# Patient Record
Sex: FEMALE | Race: WHITE | NOT HISPANIC OR LATINO | Employment: UNEMPLOYED | URBAN - METROPOLITAN AREA
[De-identification: names, ages, dates, MRNs, and addresses within clinical notes are randomized per-mention and may not be internally consistent; named-entity substitution may affect disease eponyms.]

---

## 2023-05-11 LAB
CHOLESTEROL (MG/DL) IN SER/PLAS: 273 MG/DL (ref 0–199)
CHOLESTEROL IN HDL (MG/DL) IN SER/PLAS: 49.4 MG/DL
CHOLESTEROL/HDL RATIO: 5.5
LDL: 190 MG/DL (ref 0–99)
MAGNESIUM (MG/DL) IN SER/PLAS: 2.3 MG/DL (ref 1.6–2.4)
THYROTROPIN (MIU/L) IN SER/PLAS BY DETECTION LIMIT <= 0.05 MIU/L: 2.85 MIU/L (ref 0.44–3.98)
TRIGLYCERIDE (MG/DL) IN SER/PLAS: 166 MG/DL (ref 0–149)
VLDL: 33 MG/DL (ref 0–40)

## 2024-03-19 ENCOUNTER — APPOINTMENT (OUTPATIENT)
Dept: PRIMARY CARE | Facility: CLINIC | Age: 64
End: 2024-03-19
Payer: COMMERCIAL

## 2024-04-02 NOTE — PROGRESS NOTES
History Of Present Illness  HPI   The patient is a 63-year-old female who complains of a 6-month history of a left buttock mass.  She has had some increasing discomfort in the area with activity or when sitting.  It is located at the left ischial tuberosity.  She is uncertain if it has changed in size since she first noticed it.  She has not had any drainage of the area and the skin has not been red in appearance.  She does not recall any injury.  No prior similar lumps.    Past medical history:  Basal cell carcinoma of the skin  Left breast mastalgia for which I previously saw the patient on October 28, 2020  Sigmoid colectomy for diverticulitis  Laparoscopic hysterectomy on October 14, 2022.  Pathology benign.  Migraine headaches  GERD  Kidney stones status post lithotripsy and stone extraction     Family history: Melanoma in a cousin    Past Medical History  She has a past medical history of Mastodynia (10/28/2020), Personal history of other diseases of the digestive system, Personal history of other diseases of the digestive system, Personal history of other diseases of the musculoskeletal system and connective tissue, and Personal history of urinary calculi (05/12/2020).    Surgical History  She has a past surgical history that includes Other surgical history (05/12/2020); Other surgical history (05/12/2020); Other surgical history (05/12/2020); and Other surgical history (05/12/2020).     Allergies  Sulfa (sulfonamide antibiotics), Influenza virus vaccines, Metronidazole, Oxycodone-acetaminophen, Sulfamethoxazole-trimethoprim, and Tetracyclines    Social History  She reports that she has never smoked. She has never used smokeless tobacco. She reports that she does not currently use alcohol. She reports that she does not use drugs.    Family History  No family history on file.    Review of Systems  Review of Systems:  Constitutional:  no fever, no chills, no significant weight change  Neurological: No history  "of CVA or seizure disorder  Eyes: No pain, no recent visual change  ENT:  No recent hearing loss  Neck: No pain  Cardiovascular: No chest pain, no history of cardiac disease such as myocardial infarction or arrhythmia or congestive heart failure  Pulmonary: No shortness of breath, no history of pulmonary disease such as pneumonia or COPD  Breast: No history of breast disease or breast mass  Gastrointestinal:   no abdominal pain, no nausea or vomiting, no constipation or diarrhea or blood in the stool.  No history of ulcers.  No liver, gallbladder or pancreas disease.  No intestinal disorder.  Genitourinary: No hematuria or dysuria, no kidney disease  Musculoskeletal:  no arthralgia, no muscle or bone pain  Integumentary:  no rash  Psychiatric:  No anxiety or depression  Endocrine:  no history of diabetes  Hematologic/Lymphatic: No easy bruising or bleeding          Last Recorded Vitals  Blood pressure 109/71, pulse 80, temperature 36.6 °C (97.8 °F), temperature source Temporal, resp. rate 16, height 1.702 m (5' 7\"), weight 73.8 kg (162 lb 9.6 oz), SpO2 98 %.    Physical Exam  Constitutional: Well-developed, well-nourished, alert and oriented, no acute distress  Skin: Warm and dry, no lesions, no rashes, no jaundice  HEENT: Normocephalic, atraumatic, EOMI, no scleral icterus, eyes have no redness or swelling or discharge, external inspection of ears and nose is normal, mucous membranes moist  Neck: Soft, nontender, no mass or adenopathy  Cardiac: Regular rate and rhythm, no murmur  Chest: Patent airway, clear to auscultation, normal breath sounds with good chest expansion, no wheezes or rales or rhonchi noted, thorax symmetric  Abdomen: Nondistended, positive bowel sounds, soft, nontender, no mass  Rectal: Left buttock subcutaneous soft nodule located about 5 cm from the anal verge at the ischial tuberosity.  The nodule feels deep within the subcutaneous tissue and is approximately 1 cm in diameter.  Nontender.  No " erythema or overlying skin changes.  No signs of perianal abscess.  Extremities: No injury, no lower extremity edema or calf tenderness  Lymphatic: No cervical adenopathy  Musculoskeletal: Range of motion intact, no joint swelling, normal strength  Neurological: Alert and oriented x3, intact sensory and motor function, no obvious focal neurologic abnormalities, normal gait  Psychological: Appropriate mood and behavior  Examination chaperoned by Marichuy Manuel       Assessment/Plan   Diagnoses and all orders for this visit:  Mass of buttock  -     US nonvascular extremity US extremity nonvascular real time w image documentation complete; Future  Left buttock deep subcutaneous nodule at ischial tuberosity.  Uncertain etiology.  Order ultrasound for further evaluation.  Follow-up after ultrasound completed.      Renzo Bower MD

## 2024-04-03 ENCOUNTER — OFFICE VISIT (OUTPATIENT)
Dept: SURGERY | Facility: CLINIC | Age: 64
End: 2024-04-03
Payer: COMMERCIAL

## 2024-04-03 VITALS
OXYGEN SATURATION: 98 % | TEMPERATURE: 97.8 F | BODY MASS INDEX: 25.52 KG/M2 | WEIGHT: 162.6 LBS | DIASTOLIC BLOOD PRESSURE: 71 MMHG | HEIGHT: 67 IN | SYSTOLIC BLOOD PRESSURE: 109 MMHG | RESPIRATION RATE: 16 BRPM | HEART RATE: 80 BPM

## 2024-04-03 DIAGNOSIS — R22.2 MASS OF BUTTOCK: Primary | ICD-10-CM

## 2024-04-03 PROCEDURE — 1036F TOBACCO NON-USER: CPT | Performed by: SURGERY

## 2024-04-03 PROCEDURE — 99214 OFFICE O/P EST MOD 30 MIN: CPT | Performed by: SURGERY

## 2024-04-03 RX ORDER — PANTOPRAZOLE SODIUM 40 MG/1
40 TABLET, DELAYED RELEASE ORAL
COMMUNITY
Start: 2022-08-22

## 2024-04-03 NOTE — LETTER
April 3, 2024     Tram Cisneros MD  [address]  [facility], [address]  [facility] [address]    Patient: Shanda Castellano   YOB: 1960   Date of Visit: 4/3/2024       Dear Dr. Tram Cisneros MD:    Thank you for referring Shanda Castellano to me for evaluation. Below are my notes for this consultation.  If you have questions, please do not hesitate to call me. I look forward to following your patient along with you.       Sincerely,     Renzo Bower MD      CC: No Recipients  ______________________________________________________________________________________    History Of Present Illness  HPI   The patient is a 63-year-old female who complains of a 6-month history of a left buttock mass.  She has had some increasing discomfort in the area with activity or when sitting.  It is located at the left ischial tuberosity.  She is uncertain if it has changed in size since she first noticed it.  She has not had any drainage of the area and the skin has not been red in appearance.  She does not recall any injury.  No prior similar lumps.    Past medical history:  Basal cell carcinoma of the skin  Left breast mastalgia for which I previously saw the patient on October 28, 2020  Sigmoid colectomy for diverticulitis  Laparoscopic hysterectomy on October 14, 2022.  Pathology benign.  Migraine headaches  GERD  Kidney stones status post lithotripsy and stone extraction     Family history: Melanoma in a cousin    Past Medical History  She has a past medical history of Mastodynia (10/28/2020), Personal history of other diseases of the digestive system, Personal history of other diseases of the digestive system, Personal history of other diseases of the musculoskeletal system and connective tissue, and Personal history of urinary calculi (05/12/2020).    Surgical History  She has a past surgical history that includes Other surgical history (05/12/2020); Other surgical history (05/12/2020); Other  "surgical history (05/12/2020); and Other surgical history (05/12/2020).     Allergies  Sulfa (sulfonamide antibiotics), Influenza virus vaccines, Metronidazole, Oxycodone-acetaminophen, Sulfamethoxazole-trimethoprim, and Tetracyclines    Social History  She reports that she has never smoked. She has never used smokeless tobacco. She reports that she does not currently use alcohol. She reports that she does not use drugs.    Family History  No family history on file.    Review of Systems  Review of Systems:  Constitutional:  no fever, no chills, no significant weight change  Neurological: No history of CVA or seizure disorder  Eyes: No pain, no recent visual change  ENT:  No recent hearing loss  Neck: No pain  Cardiovascular: No chest pain, no history of cardiac disease such as myocardial infarction or arrhythmia or congestive heart failure  Pulmonary: No shortness of breath, no history of pulmonary disease such as pneumonia or COPD  Breast: No history of breast disease or breast mass  Gastrointestinal:   no abdominal pain, no nausea or vomiting, no constipation or diarrhea or blood in the stool.  No history of ulcers.  No liver, gallbladder or pancreas disease.  No intestinal disorder.  Genitourinary: No hematuria or dysuria, no kidney disease  Musculoskeletal:  no arthralgia, no muscle or bone pain  Integumentary:  no rash  Psychiatric:  No anxiety or depression  Endocrine:  no history of diabetes  Hematologic/Lymphatic: No easy bruising or bleeding          Last Recorded Vitals  Blood pressure 109/71, pulse 80, temperature 36.6 °C (97.8 °F), temperature source Temporal, resp. rate 16, height 1.702 m (5' 7\"), weight 73.8 kg (162 lb 9.6 oz), SpO2 98 %.    Physical Exam  Constitutional: Well-developed, well-nourished, alert and oriented, no acute distress  Skin: Warm and dry, no lesions, no rashes, no jaundice  HEENT: Normocephalic, atraumatic, EOMI, no scleral icterus, eyes have no redness or swelling or discharge, " external inspection of ears and nose is normal, mucous membranes moist  Neck: Soft, nontender, no mass or adenopathy  Cardiac: Regular rate and rhythm, no murmur  Chest: Patent airway, clear to auscultation, normal breath sounds with good chest expansion, no wheezes or rales or rhonchi noted, thorax symmetric  Abdomen: Nondistended, positive bowel sounds, soft, nontender, no mass  Rectal: Left buttock subcutaneous soft nodule located about 5 cm from the anal verge at the ischial tuberosity.  The nodule feels deep within the subcutaneous tissue and is approximately 1 cm in diameter.  Nontender.  No erythema or overlying skin changes.  No signs of perianal abscess.  Extremities: No injury, no lower extremity edema or calf tenderness  Lymphatic: No cervical adenopathy  Musculoskeletal: Range of motion intact, no joint swelling, normal strength  Neurological: Alert and oriented x3, intact sensory and motor function, no obvious focal neurologic abnormalities, normal gait  Psychological: Appropriate mood and behavior  Examination chaperoned by Marichuy Manuel       Assessment/Plan  Diagnoses and all orders for this visit:  Mass of buttock  -     US nonvascular extremity US extremity nonvascular real time w image documentation complete; Future  Left buttock deep subcutaneous nodule at ischial tuberosity.  Uncertain etiology.  Order ultrasound for further evaluation.  Follow-up after ultrasound completed.      Renzo Bower MD

## 2024-04-09 ENCOUNTER — HOSPITAL ENCOUNTER (OUTPATIENT)
Dept: RADIOLOGY | Facility: CLINIC | Age: 64
Discharge: HOME | End: 2024-04-09
Payer: COMMERCIAL

## 2024-04-09 DIAGNOSIS — R19.00 MASS OF PELVIS: ICD-10-CM

## 2024-04-09 DIAGNOSIS — R22.2 MASS OF BUTTOCK: ICD-10-CM

## 2024-04-09 PROCEDURE — 76881 US COMPL JOINT R-T W/IMG: CPT

## 2024-04-09 PROCEDURE — 72190 X-RAY EXAM OF PELVIS: CPT

## 2024-04-16 NOTE — PROGRESS NOTES
History Of Present Illness  HPI    HPI   April 3, 2024  The patient is a 63-year-old female who complains of a 6-month history of a left buttock mass.  She has had some increasing discomfort in the area with activity or when sitting.  It is located at the left ischial tuberosity.  She is uncertain if it has changed in size since she first noticed it.  She has not had any drainage of the area and the skin has not been red in appearance.  She does not recall any injury.  No prior similar lumps.     April 17, 2024  The patient reports no change in her left buttock mass.  The patient now recalls falling onto her buttock landing onto a wooden chair about a year and a half ago.  She also previously would frequently go horseback riding following which the buttock region would be sore.    Past medical history:  Basal cell carcinoma of the skin  Left breast mastalgia for which I previously saw the patient on October 28, 2020  Sigmoid colectomy for diverticulitis  Laparoscopic hysterectomy on October 14, 2022.  Pathology benign.  Migraine headaches  GERD  Kidney stones status post lithotripsy and stone extraction      Family history: Melanoma in a cousin     Past Medical History  She has a past medical history of Mastodynia (10/28/2020), Personal history of other diseases of the digestive system, Personal history of other diseases of the digestive system, Personal history of other diseases of the musculoskeletal system and connective tissue, and Personal history of urinary calculi (05/12/2020).    Surgical History  She has a past surgical history that includes Other surgical history (05/12/2020); Other surgical history (05/12/2020); Other surgical history (05/12/2020); and Other surgical history (05/12/2020).     Allergies  Sulfa (sulfonamide antibiotics), Influenza virus vaccines, Metronidazole, Oxycodone-acetaminophen, Sulfamethoxazole-trimethoprim, and Tetracyclines    Social History  She reports that she has never smoked.  "She has never used smokeless tobacco. She reports that she does not currently use alcohol. She reports that she does not use drugs.    Family History  No family history on file.    Last Recorded Vitals  Blood pressure 117/66, pulse 73, temperature 36.5 °C (97.7 °F), temperature source Temporal, resp. rate 16, height 1.702 m (5' 7\"), weight 73.1 kg (161 lb 3.2 oz), SpO2 97%.    Physical Exam  Well-developed, well-nourished, no acute distress, alert and oriented  Buttock: Left buttock subcutaneous nodule located about 5 cm from the anal verge at the ischial tuberosity.  The nodule feels deep within the subcutaneous tissue and is somewhat ill-defined, approximately 1 and maybe 2 cm in diameter.  Minimal tenderness.  No erythema or overlying skin changes.  No signs of abscess.    Relevant Results  I reviewed the ultrasound and pelvic plain film reports and images from April 9, 2024.  FINDINGS:  There is a 3.5 x 2.2 x 1.5 cm calcified mass in the soft tissues near  the inferior pubic ramus. This is tender. This is in the subcutaneous  fat.      Three-view pelvis was performed. There are a round group of  calcifications in the soft tissues inferior to the left inferior  pubic ramus with no bony abnormality. In retrospect, this has been  seen on studies dating back to 11/27/2022 CT abdomen and pelvis.  Findings are consistent with fat necrosis or heterotopic  ossification. The presence over a longer period of time indicates a  benign process.      IMPRESSION:  Calcified mass in the left buttock near the inferior pubic ramus  consistent with fat necrosis from prior trauma or heterotopic  ossification.         Assessment/Plan   Diagnoses and all orders for this visit:  Mass of buttock  63-year-old female with a left buttock mass.  She does now recall some trauma to the area as noted above.  Imaging studies show a calcified mass in the left buttock near the inferior pubic ramus, which the radiologist feels is consistent with " fat necrosis from prior trauma or heterotopic ossification.  The radiologist reviewed a CT scan from November 2022 and feels that the area of calcifications within the soft tissue is similar to present imaging, indicating a benign process.  Because the patient has only mild symptoms and this appears to be benign on imaging, I would recommend observation.  I am concerned about wound healing if this area was to be excised because of its location overlying the ischial tuberosity.  Recommend repeat ultrasound in 6 months and follow-up after ultrasound for reexamination.  Follow-up sooner if the area appears to enlarge or if symptoms worsen.    Renzo Bower MD

## 2024-04-17 ENCOUNTER — OFFICE VISIT (OUTPATIENT)
Dept: SURGERY | Facility: CLINIC | Age: 64
End: 2024-04-17
Payer: COMMERCIAL

## 2024-04-17 VITALS
HEIGHT: 67 IN | TEMPERATURE: 97.7 F | RESPIRATION RATE: 16 BRPM | HEART RATE: 73 BPM | OXYGEN SATURATION: 97 % | SYSTOLIC BLOOD PRESSURE: 117 MMHG | BODY MASS INDEX: 25.3 KG/M2 | WEIGHT: 161.2 LBS | DIASTOLIC BLOOD PRESSURE: 66 MMHG

## 2024-04-17 DIAGNOSIS — R22.2 MASS OF BUTTOCK: Primary | ICD-10-CM

## 2024-04-17 PROCEDURE — 99213 OFFICE O/P EST LOW 20 MIN: CPT | Performed by: SURGERY

## 2024-04-17 PROCEDURE — 1036F TOBACCO NON-USER: CPT | Performed by: SURGERY

## 2024-04-17 NOTE — LETTER
April 17, 2024     Tram Cisneros MD  [address]  [facility], [address]  [facility] [address]    Patient: Shanda Castellano   YOB: 1960   Date of Visit: 4/17/2024       Dear Dr. Tram Cisneros MD:    Thank you for referring Shanda Castellano to me for evaluation. Below are my notes for this consultation.  If you have questions, please do not hesitate to call me. I look forward to following your patient along with you.       Sincerely,     Renzo Bower MD      CC: No Recipients  ______________________________________________________________________________________    History Of Present Illness  HPI    HPI   April 3, 2024  The patient is a 63-year-old female who complains of a 6-month history of a left buttock mass.  She has had some increasing discomfort in the area with activity or when sitting.  It is located at the left ischial tuberosity.  She is uncertain if it has changed in size since she first noticed it.  She has not had any drainage of the area and the skin has not been red in appearance.  She does not recall any injury.  No prior similar lumps.     April 17, 2024  The patient reports no change in her left buttock mass.  The patient now recalls falling onto her buttock landing onto a wooden chair about a year and a half ago.  She also previously would frequently go horseback riding following which the buttock region would be sore.    Past medical history:  Basal cell carcinoma of the skin  Left breast mastalgia for which I previously saw the patient on October 28, 2020  Sigmoid colectomy for diverticulitis  Laparoscopic hysterectomy on October 14, 2022.  Pathology benign.  Migraine headaches  GERD  Kidney stones status post lithotripsy and stone extraction      Family history: Melanoma in a cousin     Past Medical History  She has a past medical history of Mastodynia (10/28/2020), Personal history of other diseases of the digestive system, Personal history of other diseases of  "the digestive system, Personal history of other diseases of the musculoskeletal system and connective tissue, and Personal history of urinary calculi (05/12/2020).    Surgical History  She has a past surgical history that includes Other surgical history (05/12/2020); Other surgical history (05/12/2020); Other surgical history (05/12/2020); and Other surgical history (05/12/2020).     Allergies  Sulfa (sulfonamide antibiotics), Influenza virus vaccines, Metronidazole, Oxycodone-acetaminophen, Sulfamethoxazole-trimethoprim, and Tetracyclines    Social History  She reports that she has never smoked. She has never used smokeless tobacco. She reports that she does not currently use alcohol. She reports that she does not use drugs.    Family History  No family history on file.    Last Recorded Vitals  Blood pressure 117/66, pulse 73, temperature 36.5 °C (97.7 °F), temperature source Temporal, resp. rate 16, height 1.702 m (5' 7\"), weight 73.1 kg (161 lb 3.2 oz), SpO2 97%.    Physical Exam  Well-developed, well-nourished, no acute distress, alert and oriented  Buttock: Left buttock subcutaneous nodule located about 5 cm from the anal verge at the ischial tuberosity.  The nodule feels deep within the subcutaneous tissue and is somewhat ill-defined, approximately 1 and maybe 2 cm in diameter.  Minimal tenderness.  No erythema or overlying skin changes.  No signs of abscess.    Relevant Results  I reviewed the ultrasound and pelvic plain film reports and images from April 9, 2024.  FINDINGS:  There is a 3.5 x 2.2 x 1.5 cm calcified mass in the soft tissues near  the inferior pubic ramus. This is tender. This is in the subcutaneous  fat.      Three-view pelvis was performed. There are a round group of  calcifications in the soft tissues inferior to the left inferior  pubic ramus with no bony abnormality. In retrospect, this has been  seen on studies dating back to 11/27/2022 CT abdomen and pelvis.  Findings are consistent with " fat necrosis or heterotopic  ossification. The presence over a longer period of time indicates a  benign process.      IMPRESSION:  Calcified mass in the left buttock near the inferior pubic ramus  consistent with fat necrosis from prior trauma or heterotopic  ossification.         Assessment/Plan   Diagnoses and all orders for this visit:  Mass of buttock  63-year-old female with a left buttock mass.  She does now recall some trauma to the area as noted above.  Imaging studies show a calcified mass in the left buttock near the inferior pubic ramus, which the radiologist feels is consistent with fat necrosis from prior trauma or heterotopic ossification.  The radiologist reviewed a CT scan from November 2022 and feels that the area of calcifications within the soft tissue is similar to present imaging, indicating a benign process.  Because the patient has only mild symptoms and this appears to be benign on imaging, I would recommend observation.  I am concerned about wound healing if this area was to be excised because of its location overlying the ischial tuberosity.  Recommend repeat ultrasound in 6 months and follow-up after ultrasound for reexamination.  Follow-up sooner if the area appears to enlarge or if symptoms worsen.    Renzo Bower MD

## 2024-04-19 PROBLEM — K57.30 DIVERTICULOSIS OF COLON: Status: ACTIVE | Noted: 2024-04-19

## 2024-04-19 PROBLEM — N85.01 COMPLEX ENDOMETRIAL HYPERPLASIA WITHOUT ATYPIA: Status: ACTIVE | Noted: 2024-04-19

## 2024-04-19 PROBLEM — R93.89 THICKENED ENDOMETRIUM: Status: ACTIVE | Noted: 2024-04-19

## 2024-04-19 PROBLEM — K57.92 DIVERTICULITIS: Status: ACTIVE | Noted: 2024-04-19

## 2024-04-19 PROBLEM — G62.9 POLYNEUROPATHY: Status: ACTIVE | Noted: 2024-04-19

## 2024-04-19 PROBLEM — Z98.890 STATUS POST HYSTEROSCOPY: Status: ACTIVE | Noted: 2022-08-04

## 2024-04-19 PROBLEM — R51.9 HEADACHE, ACUTE: Status: ACTIVE | Noted: 2024-04-19

## 2024-04-19 PROBLEM — K21.00 GASTROESOPHAGEAL REFLUX DISEASE WITH ESOPHAGITIS: Status: ACTIVE | Noted: 2024-04-19

## 2024-04-19 PROBLEM — N95.0 PMB (POSTMENOPAUSAL BLEEDING): Status: ACTIVE | Noted: 2022-08-04

## 2024-04-19 PROBLEM — N64.4 MASTALGIA: Status: ACTIVE | Noted: 2024-04-19

## 2024-04-19 PROBLEM — K26.9 DUODENAL ULCER: Status: ACTIVE | Noted: 2024-04-19

## 2024-04-19 RX ORDER — NICOTINE POLACRILEX 2 MG
500 GUM BUCCAL
COMMUNITY
Start: 2022-07-26

## 2024-04-19 RX ORDER — ACETAMINOPHEN 500 MG
100 TABLET ORAL
COMMUNITY
Start: 2022-07-26

## 2024-04-19 RX ORDER — MAGNESIUM 200 MG
TABLET ORAL
COMMUNITY

## 2024-04-19 RX ORDER — ASCORBIC ACID 500 MG
500 TABLET,CHEWABLE ORAL
COMMUNITY
Start: 2022-07-26

## 2024-04-22 ENCOUNTER — OFFICE VISIT (OUTPATIENT)
Dept: PRIMARY CARE | Facility: CLINIC | Age: 64
End: 2024-04-22
Payer: COMMERCIAL

## 2024-04-22 VITALS
SYSTOLIC BLOOD PRESSURE: 121 MMHG | DIASTOLIC BLOOD PRESSURE: 80 MMHG | OXYGEN SATURATION: 99 % | HEART RATE: 83 BPM | BODY MASS INDEX: 25.96 KG/M2 | HEIGHT: 67 IN | RESPIRATION RATE: 16 BRPM | WEIGHT: 165.4 LBS | TEMPERATURE: 97.6 F

## 2024-04-22 DIAGNOSIS — R20.2 NUMBNESS AND TINGLING: ICD-10-CM

## 2024-04-22 DIAGNOSIS — R20.0 NUMBNESS AND TINGLING: ICD-10-CM

## 2024-04-22 DIAGNOSIS — E55.9 VITAMIN D DEFICIENCY: ICD-10-CM

## 2024-04-22 DIAGNOSIS — Z00.00 HEALTHCARE MAINTENANCE: ICD-10-CM

## 2024-04-22 DIAGNOSIS — R63.5 WEIGHT GAIN: Primary | ICD-10-CM

## 2024-04-22 DIAGNOSIS — G47.00 INSOMNIA, UNSPECIFIED TYPE: ICD-10-CM

## 2024-04-22 PROBLEM — K57.92 DIVERTICULITIS: Status: RESOLVED | Noted: 2024-04-19 | Resolved: 2024-04-22

## 2024-04-22 PROCEDURE — 99204 OFFICE O/P NEW MOD 45 MIN: CPT | Performed by: INTERNAL MEDICINE

## 2024-04-22 PROCEDURE — 1036F TOBACCO NON-USER: CPT | Performed by: INTERNAL MEDICINE

## 2024-04-22 RX ORDER — BUPROPION HYDROCHLORIDE 150 MG/1
150 TABLET ORAL EVERY MORNING
Qty: 30 TABLET | Refills: 2 | Status: SHIPPED | OUTPATIENT
Start: 2024-04-22 | End: 2024-10-19

## 2024-04-22 ASSESSMENT — ENCOUNTER SYMPTOMS
UNEXPECTED WEIGHT CHANGE: 0
DYSURIA: 0
WOUND: 0
HEADACHES: 0
CHILLS: 0
WHEEZING: 0
POLYDIPSIA: 0
HEMATURIA: 0
FEVER: 0
ARTHRALGIAS: 0
CHEST TIGHTNESS: 0
POLYPHAGIA: 0
PALPITATIONS: 0
COUGH: 0
NAUSEA: 0
EYE PAIN: 0
NERVOUS/ANXIOUS: 0
MYALGIAS: 0
DIARRHEA: 0
DIZZINESS: 0
VOMITING: 0
SLEEP DISTURBANCE: 1
SORE THROAT: 0
CONSTIPATION: 0
FREQUENCY: 0
SHORTNESS OF BREATH: 0
DYSPHORIC MOOD: 0
RHINORRHEA: 0
ABDOMINAL PAIN: 0
BLOOD IN STOOL: 0

## 2024-04-22 ASSESSMENT — PATIENT HEALTH QUESTIONNAIRE - PHQ9
2. FEELING DOWN, DEPRESSED OR HOPELESS: NOT AT ALL
1. LITTLE INTEREST OR PLEASURE IN DOING THINGS: NOT AT ALL
SUM OF ALL RESPONSES TO PHQ9 QUESTIONS 1 AND 2: 0

## 2024-04-22 NOTE — PROGRESS NOTES
Subjective   Patient ID: Shanda Castellano is a 63 y.o. female who presents for Establish Care.    HPI     Gained 30 pounds in 4 years  States struggled with carbs  She states she will lose weight slowly and then falls off the wagon and eats a lot of sweets and reverses what she does     Issued with meds.  MD and said issues since she was treated with flagyl and cipro for her diveriticulitis multiple times and has seen neuropathy post meds with other meds.  -Flu vaccine-- neuropathy on both legs for 2 months--very painful x 2 months  -topamax caused issues-burning pain  -Statin-burning pain  All happened    She has issues with sleep. States trazodone and melatonin did not work. Had gabapentin after surgery and this helped her sleep. She wonders if can take    States sometimes mood is a little down. Not depressed. States son lives at home with her and he struggles with anxiety and depression    Worries about meds     Review of Systems   Constitutional:  Negative for chills, fever and unexpected weight change.   HENT:  Negative for congestion, hearing loss, rhinorrhea and sore throat.    Eyes:  Negative for pain and visual disturbance.   Respiratory:  Negative for cough, chest tightness, shortness of breath and wheezing.    Cardiovascular:  Negative for chest pain and palpitations.   Gastrointestinal:  Negative for abdominal pain, blood in stool, constipation, diarrhea, nausea and vomiting.   Endocrine: Negative for cold intolerance, heat intolerance, polydipsia and polyphagia.   Genitourinary:  Negative for dysuria, frequency and hematuria.   Musculoskeletal:  Negative for arthralgias and myalgias.   Skin:  Negative for rash and wound.   Neurological:  Negative for dizziness, syncope and headaches.   Psychiatric/Behavioral:  Positive for sleep disturbance. Negative for dysphoric mood. The patient is not nervous/anxious.        Objective   /80 (BP Location: Left arm, Patient Position: Sitting, BP Cuff  "Size: Adult)   Pulse 83   Temp 36.4 °C (97.6 °F)   Resp 16   Ht 1.702 m (5' 7\")   Wt 75 kg (165 lb 6.4 oz)   SpO2 99%   BMI 25.91 kg/m²     Physical Exam  Constitutional:       Appearance: Normal appearance.   Cardiovascular:      Rate and Rhythm: Normal rate and regular rhythm.      Heart sounds: Normal heart sounds. No murmur heard.     No gallop.   Pulmonary:      Effort: Pulmonary effort is normal. No respiratory distress.      Breath sounds: Normal breath sounds.   Musculoskeletal:      Right lower leg: No edema.      Left lower leg: No edema.   Lymphadenopathy:      Cervical: No cervical adenopathy.   Neurological:      Mental Status: She is alert.         Assessment/Plan   Problem List Items Addressed This Visit    None  Visit Diagnoses         Codes    Weight gain    -  Primary R63.5    Relevant Medications    buPROPion XL (Wellbutrin XL) 150 mg 24 hr tablet    Healthcare maintenance     Z00.00    Relevant Orders    Comprehensive Metabolic Panel    CBC    Lipid Panel    TSH with reflex to Free T4 if abnormal    Vitamin D deficiency     E55.9    Relevant Orders    Vitamin D 25-Hydroxy,Total (for eval of Vitamin D levels)    Numbness and tingling     R20.0, R20.2    Relevant Orders    Vitamin B12    Insomnia, unspecified type     G47.00          Insomnia  -has gabapentin at home for other reasons. Could do this but advised long term increased risk of falls, constipation, memory etc;  -see if bupropion works firsy  -failed melatonin and trazodone    Burning pain with many meds post cipro and flagyl  -check b12 level    Weight gain and binge eating- trial bupropion (no hx of seizures). Call if issues  -return 3 months     GERD- on pantoprazole  -hx of duodenal ulcer  -has hx of strictures--recent dilation  -sees Dr. العلي    Diverticulosis and had a partial colectomy for diverticulitis  -no issues after surgery    Chronic constipation    S/p hysterectomy-- hx of endometrial hyperplasia   -sees gyn   -no " "longer needs pap    Buttock mass-seeing Dr. Bower  -\"Calcified mass in the left buttock near the inferior pubic ramus consistent with fat necrosis from prior trauma or heterotopic ossification.\"    Followup 3 months. Labs    Health Maintenance  -Pap smear: s/p hyst, does not need  -Vaccinations-UTD covid. Declines shingrix, etc due to severe neuropathic pain post flu shot  -Mammogram: 8/31/23  -Colonoscopy: sees Dr. العلي at [facility]  -DEXA: at 65    39 minutes in exam room. 9 minutes documenting and reviewing chart    "

## 2024-07-16 ENCOUNTER — PATIENT MESSAGE (OUTPATIENT)
Dept: PRIMARY CARE | Facility: CLINIC | Age: 64
End: 2024-07-16
Payer: COMMERCIAL

## 2024-07-16 DIAGNOSIS — R63.5 WEIGHT GAIN: Primary | ICD-10-CM

## 2024-07-16 RX ORDER — TOPIRAMATE 50 MG/1
50 TABLET, FILM COATED ORAL DAILY
Qty: 30 TABLET | Refills: 1 | Status: SHIPPED | OUTPATIENT
Start: 2024-07-16 | End: 2025-01-12

## 2024-07-25 ENCOUNTER — LAB (OUTPATIENT)
Dept: LAB | Facility: LAB | Age: 64
End: 2024-07-25
Payer: COMMERCIAL

## 2024-07-25 DIAGNOSIS — R20.0 NUMBNESS AND TINGLING: ICD-10-CM

## 2024-07-25 DIAGNOSIS — Z00.00 HEALTHCARE MAINTENANCE: ICD-10-CM

## 2024-07-25 DIAGNOSIS — E55.9 VITAMIN D DEFICIENCY: ICD-10-CM

## 2024-07-25 DIAGNOSIS — R20.2 NUMBNESS AND TINGLING: ICD-10-CM

## 2024-07-25 LAB
25(OH)D3 SERPL-MCNC: 39 NG/ML (ref 30–100)
ALBUMIN SERPL BCP-MCNC: 4.4 G/DL (ref 3.4–5)
ALP SERPL-CCNC: 103 U/L (ref 33–136)
ALT SERPL W P-5'-P-CCNC: 60 U/L (ref 7–45)
ANION GAP SERPL CALC-SCNC: 9 MMOL/L (ref 10–20)
AST SERPL W P-5'-P-CCNC: 43 U/L (ref 9–39)
BILIRUB SERPL-MCNC: 0.4 MG/DL (ref 0–1.2)
BUN SERPL-MCNC: 14 MG/DL (ref 6–23)
CALCIUM SERPL-MCNC: 9.6 MG/DL (ref 8.6–10.6)
CHLORIDE SERPL-SCNC: 106 MMOL/L (ref 98–107)
CHOLEST SERPL-MCNC: 255 MG/DL (ref 0–199)
CHOLESTEROL/HDL RATIO: 5.6
CO2 SERPL-SCNC: 31 MMOL/L (ref 21–32)
CREAT SERPL-MCNC: 0.89 MG/DL (ref 0.5–1.05)
EGFRCR SERPLBLD CKD-EPI 2021: 73 ML/MIN/1.73M*2
ERYTHROCYTE [DISTWIDTH] IN BLOOD BY AUTOMATED COUNT: 13.1 % (ref 11.5–14.5)
GLUCOSE SERPL-MCNC: 93 MG/DL (ref 74–99)
HCT VFR BLD AUTO: 43.3 % (ref 36–46)
HDLC SERPL-MCNC: 45.6 MG/DL
HGB BLD-MCNC: 13.7 G/DL (ref 12–16)
LDLC SERPL CALC-MCNC: 159 MG/DL
MCH RBC QN AUTO: 29.7 PG (ref 26–34)
MCHC RBC AUTO-ENTMCNC: 31.6 G/DL (ref 32–36)
MCV RBC AUTO: 94 FL (ref 80–100)
NON HDL CHOLESTEROL: 209 MG/DL (ref 0–149)
NRBC BLD-RTO: 0 /100 WBCS (ref 0–0)
PLATELET # BLD AUTO: 260 X10*3/UL (ref 150–450)
POTASSIUM SERPL-SCNC: 4.8 MMOL/L (ref 3.5–5.3)
PROT SERPL-MCNC: 7.2 G/DL (ref 6.4–8.2)
RBC # BLD AUTO: 4.61 X10*6/UL (ref 4–5.2)
SODIUM SERPL-SCNC: 141 MMOL/L (ref 136–145)
TRIGL SERPL-MCNC: 251 MG/DL (ref 0–149)
TSH SERPL-ACNC: 3.33 MIU/L (ref 0.44–3.98)
VIT B12 SERPL-MCNC: 779 PG/ML (ref 211–911)
VLDL: 50 MG/DL (ref 0–40)
WBC # BLD AUTO: 5.5 X10*3/UL (ref 4.4–11.3)

## 2024-07-25 PROCEDURE — 85027 COMPLETE CBC AUTOMATED: CPT

## 2024-07-25 PROCEDURE — 82306 VITAMIN D 25 HYDROXY: CPT

## 2024-07-25 PROCEDURE — 82607 VITAMIN B-12: CPT

## 2024-07-25 PROCEDURE — 84443 ASSAY THYROID STIM HORMONE: CPT

## 2024-07-25 PROCEDURE — 36415 COLL VENOUS BLD VENIPUNCTURE: CPT

## 2024-07-25 PROCEDURE — 80053 COMPREHEN METABOLIC PANEL: CPT

## 2024-07-25 PROCEDURE — 80061 LIPID PANEL: CPT

## 2024-07-29 ENCOUNTER — APPOINTMENT (OUTPATIENT)
Dept: PRIMARY CARE | Facility: CLINIC | Age: 64
End: 2024-07-29
Payer: COMMERCIAL

## 2024-07-29 DIAGNOSIS — R79.89 ELEVATED LFTS: Primary | ICD-10-CM

## 2024-09-06 ENCOUNTER — APPOINTMENT (OUTPATIENT)
Dept: OBSTETRICS AND GYNECOLOGY | Facility: CLINIC | Age: 64
End: 2024-09-06
Payer: COMMERCIAL

## 2024-09-06 VITALS
BODY MASS INDEX: 26.06 KG/M2 | HEIGHT: 67 IN | DIASTOLIC BLOOD PRESSURE: 72 MMHG | WEIGHT: 166 LBS | SYSTOLIC BLOOD PRESSURE: 117 MMHG

## 2024-09-06 DIAGNOSIS — Z01.419 ENCOUNTER FOR ANNUAL ROUTINE GYNECOLOGICAL EXAMINATION: Primary | ICD-10-CM

## 2024-09-06 DIAGNOSIS — Z12.31 ENCOUNTER FOR SCREENING MAMMOGRAM FOR MALIGNANT NEOPLASM OF BREAST: ICD-10-CM

## 2024-09-06 PROCEDURE — 99396 PREV VISIT EST AGE 40-64: CPT | Performed by: OBSTETRICS & GYNECOLOGY

## 2024-09-06 PROCEDURE — 3008F BODY MASS INDEX DOCD: CPT | Performed by: OBSTETRICS & GYNECOLOGY

## 2024-09-06 PROCEDURE — 1036F TOBACCO NON-USER: CPT | Performed by: OBSTETRICS & GYNECOLOGY

## 2024-09-06 ASSESSMENT — PAIN SCALES - GENERAL: PAINLEVEL: 0-NO PAIN

## 2024-09-06 NOTE — PROGRESS NOTES
Subjective   Patient ID: Shanda Castellano is a 64 y.o. female who presents for Annual Exam (Additional Concerns: None/Chaperone: Declined/STD Screening: N/A).  HPI  Patient is a 64-year-old  2 para 2 whose had 2 spontaneous vaginal deliveries.  Status post total laparoscopic hysterectomy with bilateral salpingo-oophorectomy 2022.  She does not use hormones denies any vaginal bleeding.  She admits to regular bowel movements is up-to-date on her colonoscopy and denies any urinary symptoms.  Review of Systems    Objective   Physical Exam  Gen.: Alert and in no acute distress. Well-developed, well-nourished.  Thyroid: Nonenlarged and no palpable thyroid nodules  Cardiovascular: Heart regular rate and rhythm  Pulmonary: Clear bilateral breath sounds  Breasts: Normal appearance, no nipple discharge and no skin changes. No palpable masses and no axillary lymphadenopathy  Abdomen: Soft and nontender, no abdominal mass palpated, no organomegaly   Pelvic: External genitalia normal, Bartholin's urethral and Brule's glands normal. Vagina normal.  Cervix and uterus surgically absent.  Right adnexa normal without masses. Left adnexa normal without masses. Perianal area and normal.  Assessment/Plan   Annual GYN exam, Pap and HPV no longer needed, patient given a requisition for mammogram.  She and I discussed regular exercise and diet at length.  She will follow-up in 1 year or as needed.         Mark Fajardo MD 24 11:34 AM

## 2024-09-16 ENCOUNTER — HOSPITAL ENCOUNTER (OUTPATIENT)
Dept: RADIOLOGY | Facility: CLINIC | Age: 64
Discharge: HOME | End: 2024-09-16
Payer: COMMERCIAL

## 2024-09-16 VITALS — BODY MASS INDEX: 26.06 KG/M2 | HEIGHT: 67 IN | WEIGHT: 166 LBS

## 2024-09-16 DIAGNOSIS — Z12.31 ENCOUNTER FOR SCREENING MAMMOGRAM FOR MALIGNANT NEOPLASM OF BREAST: ICD-10-CM

## 2024-09-16 PROCEDURE — 77067 SCR MAMMO BI INCL CAD: CPT | Performed by: RADIOLOGY

## 2024-09-16 PROCEDURE — 77067 SCR MAMMO BI INCL CAD: CPT

## 2024-09-16 PROCEDURE — 77063 BREAST TOMOSYNTHESIS BI: CPT | Performed by: RADIOLOGY

## 2024-10-10 ENCOUNTER — HOSPITAL ENCOUNTER (OUTPATIENT)
Dept: RADIOLOGY | Facility: CLINIC | Age: 64
Discharge: HOME | End: 2024-10-10
Payer: COMMERCIAL

## 2024-10-10 DIAGNOSIS — R22.2 MASS OF BUTTOCK: ICD-10-CM

## 2024-10-10 PROCEDURE — 76882 US LMTD JT/FCL EVL NVASC XTR: CPT

## 2024-10-28 ENCOUNTER — LAB (OUTPATIENT)
Dept: LAB | Facility: LAB | Age: 64
End: 2024-10-28
Payer: COMMERCIAL

## 2024-10-28 DIAGNOSIS — R79.89 ELEVATED LFTS: ICD-10-CM

## 2024-10-28 DIAGNOSIS — R79.89 ELEVATED LFTS: Primary | ICD-10-CM

## 2024-10-28 LAB
ALBUMIN SERPL BCP-MCNC: 4.4 G/DL (ref 3.4–5)
ALP SERPL-CCNC: 99 U/L (ref 33–136)
ALT SERPL W P-5'-P-CCNC: 61 U/L (ref 7–45)
AST SERPL W P-5'-P-CCNC: 48 U/L (ref 9–39)
BILIRUB DIRECT SERPL-MCNC: 0.1 MG/DL (ref 0–0.3)
BILIRUB SERPL-MCNC: 0.4 MG/DL (ref 0–1.2)
PROT SERPL-MCNC: 7.2 G/DL (ref 6.4–8.2)

## 2024-10-28 PROCEDURE — 36415 COLL VENOUS BLD VENIPUNCTURE: CPT

## 2024-10-28 PROCEDURE — 80076 HEPATIC FUNCTION PANEL: CPT

## 2024-10-30 ENCOUNTER — APPOINTMENT (OUTPATIENT)
Dept: RADIOLOGY | Facility: HOSPITAL | Age: 64
End: 2024-10-30
Payer: COMMERCIAL

## 2024-10-31 ENCOUNTER — HOSPITAL ENCOUNTER (OUTPATIENT)
Dept: RADIOLOGY | Facility: CLINIC | Age: 64
Discharge: HOME | End: 2024-10-31
Payer: COMMERCIAL

## 2024-10-31 DIAGNOSIS — R79.89 ELEVATED LFTS: ICD-10-CM

## 2024-10-31 PROCEDURE — 76705 ECHO EXAM OF ABDOMEN: CPT

## 2025-07-28 ENCOUNTER — EVALUATION (OUTPATIENT)
Dept: SPEECH THERAPY | Facility: HOSPITAL | Age: 65
End: 2025-07-28
Payer: MEDICARE

## 2025-07-28 ENCOUNTER — OFFICE VISIT (OUTPATIENT)
Dept: OTOLARYNGOLOGY | Facility: HOSPITAL | Age: 65
End: 2025-07-28
Payer: MEDICARE

## 2025-07-28 VITALS — HEIGHT: 67 IN | BODY MASS INDEX: 25.74 KG/M2 | TEMPERATURE: 96.8 F | WEIGHT: 164 LBS

## 2025-07-28 DIAGNOSIS — R49.0 VOICE HOARSENESS: ICD-10-CM

## 2025-07-28 DIAGNOSIS — R49.0 DYSPHONIA: Primary | ICD-10-CM

## 2025-07-28 DIAGNOSIS — R49.8 VOICE FATIGUE: Primary | ICD-10-CM

## 2025-07-28 PROCEDURE — 99214 OFFICE O/P EST MOD 30 MIN: CPT | Mod: 25 | Performed by: OTOLARYNGOLOGY

## 2025-07-28 PROCEDURE — 31579 LARYNGOSCOPY TELESCOPIC: CPT | Performed by: OTOLARYNGOLOGY

## 2025-07-28 PROCEDURE — 1159F MED LIST DOCD IN RCRD: CPT | Performed by: OTOLARYNGOLOGY

## 2025-07-28 PROCEDURE — 99204 OFFICE O/P NEW MOD 45 MIN: CPT | Performed by: OTOLARYNGOLOGY

## 2025-07-28 PROCEDURE — 3008F BODY MASS INDEX DOCD: CPT | Performed by: OTOLARYNGOLOGY

## 2025-07-28 PROCEDURE — 1036F TOBACCO NON-USER: CPT | Performed by: OTOLARYNGOLOGY

## 2025-07-28 PROCEDURE — 92524 BEHAVRAL QUALIT ANALYS VOICE: CPT | Mod: GN | Performed by: SPEECH-LANGUAGE PATHOLOGIST

## 2025-07-28 ASSESSMENT — PAIN - FUNCTIONAL ASSESSMENT: PAIN_FUNCTIONAL_ASSESSMENT: 0-10

## 2025-07-28 ASSESSMENT — PAIN SCALES - GENERAL: PAINLEVEL_OUTOF10: 0 - NO PAIN

## 2025-07-28 NOTE — PROGRESS NOTES
Assessment/Plan   Assessment & Plan  The patient presents with dysphonia and a history of gastroesophageal reflux disease (GERD). Stroboscopy revealed mild asymmetry and open phase predominance, but no masses or lesions. Slight edema and redness noted, possibly due to reflux or coughing. Voice quality satisfactory but perceived as abnormal.     We discussed options for management to include: recommendations from the speech pathologist, avoiding overexertion of the voice, and resting it when necessary.     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    - Recommendations from the speech pathologist will be provided.    - Advised to avoid overexertion of the voice and rest it when necessary.    - Gastroesophageal Reflux Disease (GERD):    - Long-standing history of GERD, managed with Protonix 40 mg daily.    - Symptoms generally well-controlled, with occasional tightness reported.    Follow-up: Next scheduled visit to be determined based on the patient's response to the current management plan.    PROCEDURE  Procedure Performed  Stroboscopy    Anesthesia  Topical lidocaine spray    Technique  Pre-Procedure:  - Risks and Benefits: Discussed the procedure's ability to visualize vocal cord function and rule out serious conditions such as infection, inflammation, growths, or cancer.  - Side effects: Temporary numbness and altered sensation in the throat due to the numbing spray.  - Consent: Verbal consent obtained.    Intra-Procedure:  - Time-Out: Confirmed patient's identity and procedure.  - Site Preparation: Applied Afrin and lidocaine spray to both nostrils.  - Medication: Afrin and lidocaine spray.    Post-Procedure:  - Tolerance Level: Patient tolerated the procedure well.  - Home Care Instructions: Advised not to eat or drink for 30 minutes post-procedure due to temporary numbness.       Reason For Consult  Chief Complaint    Patient presents with    Laryngitis        History of Present Illness  The patient is a 65-year-old female who has been experiencing voice problems for an extended period and came in today with her  seeking answers and potential solutions.    Voice Problems  - Symptoms began on June 16, 2025, with a mild sore throat initially thought to be due to acid reflux, which she has had for years.  - She later suspected it might be viral and started taking Zicam, which prevented a full-blown cold but did not improve her symptoms.  - By the end of that week, she completely lost her voice, an unprecedented experience for her.  - Her voice began to return slightly in early July but remains limited; she avoids talking excessively.  - Currently, her voice is the best it has been since the onset, but it tires quickly, lacks range, and feels as though mucus is present on her vocal cords.  - She describes her voice as sounding like she has a cold, and speaking requires significant effort.  - She has always had postnasal drip and sinus issues since childhood, which have affected her singing voice due to excessive mucus.  - She seeks answers regarding her voice problems and potential remedies for her postnasal drip.  - Her sore throat resolved around July 8, 2025, but her throat still feels tired rather than painful.  - She has to push extra air to produce sounds, which leaves her feeling fatigued.  - She has not experienced breathing problems or coughing but occasionally chokes and has had laryngospasms, including a severe episode during an upper endoscopy two years ago.  - She does not take antihistamines and does not have seasonal allergies.  - A few years ago, an ENT prescribed Flonase, which provided mild relief, but she discontinued it due to cost.  - She did not engage in unusual activities like yelling or singing before losing her voice.  - She is retired and typically uses her voice normally but has been talking more  on the phone recently due to family crises.  - She has never had laryngitis or other voice issues like hoarseness before this.    Multinodular Thyroid  - She has a multinodular thyroid but has not experienced any thyroid function problems.    Additional Medical History  - She does not have any heart or lung issues.  - She has not had any surgeries on her neck, throat, or mouth.  - She has not experienced ear pain.    Stress and Emotional Factors  - She has been under significant stress recently due to the deaths of her mother, sister, and brother-in-law, all occurring within a short time.    Reflux History  - She has had reflux for a long time and takes Protonix 40 mg daily, which she feels effectively controls her symptoms.    PAST SURGICAL HISTORY:  - Partial colectomy for diverticulitis  - Hysterectomy    SOCIAL HISTORY  - Does not smoke    FAMILY HISTORY  - No family history of head and neck cancer or throat cancer        Past Medical History  She has a past medical history of Dizziness (2000), Fatigue (2000), GERD (gastroesophageal reflux disease) (2010), Mastodynia (10/28/2020), Migraine (1969), Personal history of other diseases of the digestive system, Personal history of other diseases of the digestive system, Personal history of other diseases of the musculoskeletal system and connective tissue, Personal history of urinary calculi (05/12/2020), Sleep difficulties (2010), and Tinnitus (2010). Surgical History  She has a past surgical history that includes Other surgical history (05/12/2020); Other surgical history (05/12/2020); Other surgical history (05/12/2020); Other surgical history (05/12/2020); and Hysterectomy.   Social History  She reports that she has never smoked. She has never used smokeless tobacco. She reports that she does not currently use alcohol. She reports that she does not use drugs. Allergies  Sulfa (sulfonamide antibiotics), Flu vac 2023 [id number](pf), Influenza virus vaccines,  Metronidazole, Oxycodone-acetaminophen, Sulfamethoxazole-trimethoprim, and Tetracyclines     Family History  Family History[1]     Review of Systems  All 10 systems were reviewed and negative except for above.      Physical Exam  ENT Physical Exam      Physical Exam  PHYSICAL EXAMINATION:    Appearance: Patient appears well-developed, well-nourished, no acute distress.  Head/Face: Symmetric facial features, no masses/lesions.  Salivary Glands: Parotid/submandibular glands normal bilaterally.  Ears: Normal.  External/Internal Nose: Normal.  Oral Cavity: Normal.  Pharynx: No tonsillar hypertrophy/erythema.  Neck Palpation: No tenderness.  Respiratory: Breathing comfortably, no stridor/retractions.  CV/Extremities: No clubbing/cyanosis/peripheral edema.  Cranial Nerves: II-XII grossly intact and symmetric.  Mood/Affect: Appropriate, no agitation.    STROBOSCOPY:    VOICE AND SPEECH CHARACTERISTICS:  Normal spoken speech.  Grade: Normal - 0.  Roughness: Absent - 0.  Breathiness: Absent - 0.  Asthenia: Absent - 0.  Strain: Absent - 0.    Additional Voice Characteristics:  Pitch: Within normal range.  Intelligibility: Normal.  Resonance: Balanced.  Vocal Loudness: Adequate.  Breath Support: Sustained.    Reflux Finding Score:  Subglottic edema: Absent.  Thick Mucus: Absent.  Granuloma: Absent.  Ventricular Obliteration: Partial.  Erythema/Hyperemia: Mild arytenoid erythema.  IA Thickening: Mild interarytenoid thickening.  TVC Edema: Absent.  Diffuse Laryngitis: Absent.    PROCEDURE:  Indications: Per HPI.  Technique: Patient seated, topical anesthesia applied; flexible endoscope advanced to evaluate nasopharynx, oropharynx, hypopharynx, larynx, and subglottis.    Gross Arytenoid Movement: Symmetric.  Height: Equal bilaterally.  Supraglottic Tension: Slight lateral supraglottic tension.  Symmetry: Relatively normal symmetry.  Amplitude: Normal.  Closure: Vocal cords close with occasional anterior and posterior  "gap.  Mucosal Wave Lateral Excursion/Secondary Wave: No mucosal wave restriction.  Periodicity: In phase and periodic with mild aperiodic portions.  Closure: Vocal cords close with occasional anterior and posterior gap.     ----------------------------------------------------------------------  Procedures     Last Recorded Vitals  Temperature 36 °C (96.8 °F), temperature source Temporal, height 1.702 m (5' 7\"), weight 74.4 kg (164 lb).    Relevant Results    Results  - Imaging:    - Stroboscopy:      - Vocal cords close      - Mild asymmetry      - No masses or lesions      - Slight edema and redness      - Open phase predominance      - No mucosal wave restriction      - Occasional anterior and posterior gap          Patient Reported Outcome Measures       Radiology, Laboratory and Pathology     Time Spent  Prep time on day of patient encounter: 5-10 minutes  Time spent directly with patient, family or caregiver: 25 minutes  Additional Time Spent on Patient Care Activities/discuss care plan with SLP: 5 minutes  Documentation Time: 10 minutes  Other Time Spent: 0 minutes  Total: 50 minutes     This medical note was created with the assistance of artificial intelligence (AI) for documentation purposes. The content has been reviewed and confirmed by the healthcare provider for accuracy and completeness. Patient consented to the use of audio recording and use of AI during their visit.             [1]   Family History  Problem Relation Name Age of Onset    Colon cancer Mother      Skin cancer Mother      Hypertension Mother      Other (Bladder Cancer) Father      Hypertension Father      Heart disease Father      Alzheimer's disease Father      Rheum arthritis Mother Estelle Milligan     Cancer Mother Estelle Milligan 78    Thyroid disease Mother Estelle Milligan 40 - 49    Hyperlipidemia Mother Estelle Milligan 60 - 69    Hearing loss Mother Estelle Milligan 90    Heart failure Father Hugh Milligan 40 - 49    Cancer " Father Hugh Milligan 60 - 69    Hyperlipidemia Father Hugh Milligan 40 - 49

## 2025-07-28 NOTE — PROGRESS NOTES
Speech-Language Pathology    Voice Evaluation    Patient Name: Shanda Castellano  MRN: [id number]  Today's Date: 7/28/2025     Time Calculation  Start Time: 1100  Stop Time: 1130  Time Calculation (min): 30 min      Current Problem:  Problem List[1]    Voice Assessment:   The patient presents with dysphonia and a history of gastroesophageal reflux disease (GERD). Stroboscopy revealed mild asymmetry and open phase predominance, but no masses or lesions. Slight edema and redness noted, possibly due to reflux or coughing. Voice quality satisfactory but perceived as abnormal.     Patient would benefit from direct voice therapy with emphasis on laryngeal balancing. Today we discussed voice naps after prolonged periods of talking. Briefly assessed her mental health support as she has experienced many losses in her family this year. Will plan to transfer to Dr. Raymundo Hernandez at discharge to help support her singing voice.     Overall, patient would benefit from skilled ST in the area of voice in order to increase vocal wellness, healthy voicing to foster increased participation and comfort levels at home, work and in the community environment.    Skilled ST services are medically necessary and ordered by a physician in order to provide vocal hygiene program, decrease phono trauma and promote healthy voicing to encourage active, safe and sustainable vocal participation in the completion of patient's ADLs.      Voice Plan of Care:  Frequency: x1/eowk  Duration: x12 weeks  Number of Visits: 4-8  Recommendations for therapeutic interventions: Speech/Voice exercises, Vocal hygiene program, Oral resonance techniques, Habituation training, Vocal strengthening techniques  Prognosis: Good  Factors affecting prognosis: None  Discussed Plan of Care: Patient, Physician, Discussed risks/benefits with patient/caregiver, Patient/caregiver agreeable with Plan of Care      Subjective   Current Problem:   The patient is a 65-year-old female who  has been experiencing voice problems for an extended period and came in today with her  seeking answers and potential solutions.    Voice Problems  - Symptoms began on June 16, 2025, with a mild sore throat initially thought to be due to acid reflux, which she has had for years.  - She later suspected it might be viral and started taking Zicam, which prevented a full-blown cold but did not improve her symptoms.  - By the end of that week, she completely lost her voice, an unprecedented experience for her.  - Her voice began to return slightly in early July but remains limited; she avoids talking excessively.  - Currently, her voice is the best it has been since the onset, but it tires quickly, lacks range, and feels as though mucus is present on her vocal cords.  - She describes her voice as sounding like she has a cold, and speaking requires significant effort.  - She has always had postnasal drip and sinus issues since childhood, which have affected her singing voice due to excessive mucus.  - She seeks answers regarding her voice problems and potential remedies for her postnasal drip.  - Her sore throat resolved around July 8, 2025, but her throat still feels tired rather than painful.  - She has to push extra air to produce sounds, which leaves her feeling fatigued.  - She has not experienced breathing problems or coughing but occasionally chokes and has had laryngospasms, including a severe episode during an upper endoscopy two years ago.  - She does not take antihistamines and does not have seasonal allergies.  - A few years ago, an ENT prescribed Flonase, which provided mild relief, but she discontinued it due to cost.  - She did not engage in unusual activities like yelling or singing before losing her voice.  - She is retired and typically uses her voice normally but has been talking more on the phone recently due to family crises.  - She has never had laryngitis or other voice issues like hoarseness  before this.    Multinodular Thyroid  - She has a multinodular thyroid but has not experienced any thyroid function problems.    Additional Medical History  - She does not have any heart or lung issues.  - She has not had any surgeries on her neck, throat, or mouth.  - She has not experienced ear pain.    Stress and Emotional Factors  - She has been under significant stress recently due to the deaths of her mother, sister, and brother-in-law, all occurring within a short time.    Reflux History  - She has had reflux for a long time and takes Protonix 40 mg daily, which she feels effectively controls her symptoms.    PAST SURGICAL HISTORY:  - Partial colectomy for diverticulitis  - Hysterectomy    SOCIAL HISTORY  - Does not smoke    FAMILY HISTORY  - No family history of head and neck cancer or throat cancer        Past Medical History  She has a past medical history of Dizziness (2000), Fatigue (2000), GERD (gastroesophageal reflux disease) (2010), Mastodynia (10/28/2020), Migraine (1969), Personal history of other diseases of the digestive system, Personal history of other diseases of the digestive system, Personal history of other diseases of the musculoskeletal system and connective tissue, Personal history of urinary calculi (05/12/2020), Sleep difficulties (2010), and Tinnitus (2010). Surgical History  She has a past surgical history that includes Other surgical history (05/12/2020); Other surgical history (05/12/2020); Other surgical history (05/12/2020); Other surgical history (05/12/2020); and Hysterectomy.   Social History  She reports that she has never smoked. She has never used smokeless tobacco. She reports that she does not currently use alcohol. She reports that she does not use drugs. Allergies  Sulfa (sulfonamide antibiotics), Flu vac 2023 [id number](pf), Influenza virus vaccines, Metronidazole, Oxycodone-acetaminophen, Sulfamethoxazole-trimethoprim, and Tetracyclines       SLP Visit Info:  SLP  Received On: 07/28/25     General Visit Information:  Patient Class: Outpatient  Living Environment: Home  Arrival: Family/caregiver present  Ordering Physician: Dr. Moe  Reason for Referral: weak voicing    Objective     Pain Assessment:  Pain Assessment  Pain Assessment: 0-10  0-10 (Numeric) Pain Score: 0 - No pain    Voice Use Inventory:  Voice misuse/abuse: None  Exposure to Noise: No  Exposure to respiratory irritants: No  Consistent use of singing voice: Yes  Occupation relies on speaking voice: Yes    Patient Self Assessment:  Daily water intake: Yes  Daily caffeine intake: Yes  Alcohol intake: Yes  Smoking history: No  Reflux history: No    Voice Objective:  Motor Speech Production: WFL  Pitch: Inadequate range  Voice Quality: Hoarse, Strained-Strangled  Fluency: WFL  Prosody: WFL  Resonance: WFL  Loudness: Inadequate range    Voice Analysis:   STROBOSCOPY:    VOICE AND SPEECH CHARACTERISTICS:  Normal spoken speech.  Grade: Normal - 0.  Roughness: Absent - 0.  Breathiness: Absent - 0.  Asthenia: Absent - 0.  Strain: Absent - 0.    Additional Voice Characteristics:  Pitch: Within normal range.  Intelligibility: Normal.  Resonance: Balanced.  Vocal Loudness: Adequate.  Breath Support: Sustained.    Reflux Finding Score:  Subglottic edema: Absent.  Thick Mucus: Absent.  Granuloma: Absent.  Ventricular Obliteration: Partial.  Erythema/Hyperemia: Mild arytenoid erythema.  IA Thickening: Mild interarytenoid thickening.  TVC Edema: Absent.  Diffuse Laryngitis: Absent.    PROCEDURE:  Indications: Per HPI.  Technique: Patient seated, topical anesthesia applied; flexible endoscope advanced to evaluate nasopharynx, oropharynx, hypopharynx, larynx, and subglottis.    Gross Arytenoid Movement: Symmetric.  Height: Equal bilaterally.  Supraglottic Tension: Slight lateral supraglottic tension.  Symmetry: Relatively normal symmetry.  Amplitude: Normal.  Closure: Vocal cords close with occasional anterior and posterior  gap.  Mucosal Wave Lateral Excursion/Secondary Wave: No mucosal wave restriction.  Periodicity: In phase and periodic with mild aperiodic portions.  Closure: Vocal cords close with occasional anterior and posterior gap.     Voice Treatment:  Individual(s) Educated: Patient  Verbal Education: Risks/benefits of therapy, Results of testing, Communication strategies  Response to Education: Verbalized understanding, Demonstrated understanding, Needs review  Patient/Caregiver Verbalized Understanding and Agreement: Yes          Time In: 1100  Time Out: 1130                      Patient will increase vocal wellness and decrease phonotrauma in adherence with clinician prescribed vocal hygiene and wellness program per patient report.   Patient will increase ability to produce voice without tension within 5 minute conversational task x80% accuracy as judged by clinician observation and/or patient report.   Patient will accesses the upper fourth of their functional phonation range at 80 dB or below without delayed onset of phonation or obvious auditory or physical signs of laryngeal or fariba-laryngeal hyperfunction.   Patient will demonstrate independent use of voice/breathing techniques x80% accuracy.           [1]   Patient Active Problem List  Diagnosis    Mass of buttock    Complex endometrial hyperplasia without atypia    Diverticulosis of colon    Duodenal ulcer    Gastroesophageal reflux disease with esophagitis    Headache, acute    Mastalgia    PMB (postmenopausal bleeding)    Polyneuropathy    Status post hysteroscopy    Thickened endometrium    Dysphonia

## 2025-08-27 ENCOUNTER — TREATMENT (OUTPATIENT)
Dept: SPEECH THERAPY | Facility: HOSPITAL | Age: 65
End: 2025-08-27
Payer: MEDICARE

## 2025-08-27 DIAGNOSIS — R49.0 DYSPHONIA: ICD-10-CM

## 2025-08-27 PROCEDURE — 92507 TX SP LANG VOICE COMM INDIV: CPT | Mod: GN | Performed by: SPEECH-LANGUAGE PATHOLOGIST

## 2025-08-27 ASSESSMENT — PAIN - FUNCTIONAL ASSESSMENT: PAIN_FUNCTIONAL_ASSESSMENT: 0-10

## 2025-08-27 ASSESSMENT — PAIN SCALES - GENERAL: PAINLEVEL_OUTOF10: 0 - NO PAIN

## 2025-12-02 ENCOUNTER — APPOINTMENT (OUTPATIENT)
Dept: OBSTETRICS AND GYNECOLOGY | Facility: CLINIC | Age: 65
End: 2025-12-02
Payer: MEDICARE